# Patient Record
Sex: FEMALE | Race: WHITE | Employment: FULL TIME | URBAN - METROPOLITAN AREA
[De-identification: names, ages, dates, MRNs, and addresses within clinical notes are randomized per-mention and may not be internally consistent; named-entity substitution may affect disease eponyms.]

---

## 2017-01-12 ENCOUNTER — HOSPITAL ENCOUNTER (OUTPATIENT)
Age: 32
Discharge: HOME | End: 2017-01-12
Attending: FAMILY MEDICINE
Payer: COMMERCIAL

## 2017-01-12 VITALS
RESPIRATION RATE: 18 BRPM | BODY MASS INDEX: 36.29 KG/M2 | SYSTOLIC BLOOD PRESSURE: 125 MMHG | TEMPERATURE: 98 F | HEART RATE: 80 BPM | HEIGHT: 59 IN | DIASTOLIC BLOOD PRESSURE: 80 MMHG | OXYGEN SATURATION: 100 % | WEIGHT: 180 LBS

## 2017-01-12 DIAGNOSIS — J11.1 INFLUENZA: Primary | ICD-10-CM

## 2017-01-12 LAB — B-HCG UR QL: NEGATIVE

## 2017-01-12 PROCEDURE — 99213 OFFICE O/P EST LOW 20 MIN: CPT

## 2017-01-12 PROCEDURE — 81025 URINE PREGNANCY TEST: CPT

## 2017-01-12 PROCEDURE — 99214 OFFICE O/P EST MOD 30 MIN: CPT

## 2017-01-12 RX ORDER — ONDANSETRON 4 MG/1
4 TABLET, ORALLY DISINTEGRATING ORAL EVERY 4 HOURS PRN
Qty: 10 TABLET | Refills: 0 | Status: SHIPPED | OUTPATIENT
Start: 2017-01-12 | End: 2017-01-19

## 2017-01-13 NOTE — ED INITIAL ASSESSMENT (HPI)
N/V/D, chills, fever, dizziness x 1 1/2 weeks. No sick contacts at home. Body aches, headaches, sore throat. No flu shot this year.

## 2017-01-13 NOTE — ED PROVIDER NOTES
Patient Seen in: [facility] Immediate Care In [address] [place] [address]    History   Patient presents with:  Fever  Nausea/Vomiting/Diarrhea (gastrointestinal)    Stated Complaint: body ache,n/v/d    HPI    Patient here with cough, congestion, sneezing, sore throat, HPI.  Constitutional and vital signs reviewed. All other systems reviewed and negative except as noted above. PSFH elements reviewed from today and agreed except as otherwise stated in HPI.     Physical Exam       ED Triage Vitals   BP 01/12/17 1854 as warning signs and symptoms of when to return to clinic or go to the ER.     Disposition and Plan     Clinical Impression:  Influenza  (primary encounter diagnosis)    Disposition:  Discharge    Follow-up:  Isela Weaver MD  [address] [facility]

## 2017-05-05 ENCOUNTER — OFFICE VISIT (OUTPATIENT)
Dept: INTERNAL MEDICINE CLINIC | Facility: CLINIC | Age: 32
End: 2017-05-05

## 2017-05-05 ENCOUNTER — TELEPHONE (OUTPATIENT)
Dept: INTERNAL MEDICINE CLINIC | Facility: CLINIC | Age: 32
End: 2017-05-05

## 2017-05-05 VITALS
DIASTOLIC BLOOD PRESSURE: 73 MMHG | WEIGHT: 205 LBS | TEMPERATURE: 99 F | SYSTOLIC BLOOD PRESSURE: 112 MMHG | HEART RATE: 65 BPM | BODY MASS INDEX: 41 KG/M2

## 2017-05-05 DIAGNOSIS — M26.609 TMJ DYSFUNCTION: Primary | ICD-10-CM

## 2017-05-05 PROCEDURE — 99212 OFFICE O/P EST SF 10 MIN: CPT | Performed by: INTERNAL MEDICINE

## 2017-05-05 PROCEDURE — 99213 OFFICE O/P EST LOW 20 MIN: CPT | Performed by: INTERNAL MEDICINE

## 2017-05-05 RX ORDER — CYCLOBENZAPRINE HCL 10 MG
5 TABLET ORAL 2 TIMES DAILY
Qty: 20 TABLET | Refills: 1 | Status: SHIPPED | OUTPATIENT
Start: 2017-05-05 | End: 2017-06-22 | Stop reason: ALTCHOICE

## 2017-05-05 NOTE — PROGRESS NOTES
HPI:    Patient ID: Samaria Eng is a 28year old female. HPI She came in today complaining of jaw pain. According to her she was eating pretzels yesterday she is not sure if she heard clicking sound in her jaw , ut she started feeling pain , she Cyclobenzaprine HCl 10 MG Oral Tab Take 0.5 tablets (5 mg total) by mouth 2 (two) times daily. Disp: 20 tablet Rfl: 1   Prenatal Vit-Fe Fumarate-FA (PRENAPLUS) 27-1 MG Oral Tab Take  by mouth.  Disp:  Rfl:    MetFORMIN HCl 500 MG Oral Tab Take 1 tablet (500 Neck: Normal range of motion. Neck supple. No JVD present. No tracheal tenderness present. No tracheal deviation present. No thyroid mass and no thyromegaly present.    Cardiovascular: Normal rate, regular rhythm, normal heart sounds and intact distal pulse

## 2017-05-05 NOTE — TELEPHONE ENCOUNTER
patient was eating a pretzel yesterday and her jaw felt it popped out of place now her ear,jaw and teeth are numb  Please advice

## 2017-05-05 NOTE — TELEPHONE ENCOUNTER
Spoke with patient having jaw pain not relieved with ibuprofen or massage. Trouble talking or chewing. Made appt for today.

## 2017-05-05 NOTE — PATIENT INSTRUCTIONS
TMJ Syndrome  The temporomandibular joint (TMJ) is the joint that connects your lower jaw to your head. You can feel it in front of your ears when you open and close your mouth. TMJ disorders involve chronic or recurrent pain in the joint.  When treated, If stress seems to be contributing to your symptoms, try to identify the sources of stress in your life. These aren’t always obvious.  Common stressors include:  · Everyday hassles (which can add up), such as traffic jams, missed appointments, or car troubl · Trouble breathing or swallowing, wheezing  · Confusion  · Extreme drowsiness or trouble awakening  · Fainting or loss of consciousness  · Rapid heart rate  When to seek medical advice  Call your healthcare provider right away if any of these occur:  · Lillie Rodriguez · Avoid hard or chewy foods. Even if you feel fine, eating such foods can trigger symptoms again. · Be aware of your body. Don’t ignore TMD symptoms. The nagging pain in your neck or jaw may be a sign that you need care.   · Be sure to keep follow-up appoi You have been diagnosed with temporomandibular disorder (TMD). This term describes a group of problems related to the temporomandibular joint (TMJ)and nearby muscles. The TMJ is located where the upper and lower jaws meet.  TMD can cause painful and frustra · Massage, both inside and outside the mouth. This relaxes muscles and improves circulation. · Palpation, which is applying pressure to points of the jaw and face with the fingers. · Cold spray and stretching of the muscles to relax them.   · An anestheti

## 2017-06-22 ENCOUNTER — OFFICE VISIT (OUTPATIENT)
Dept: INTERNAL MEDICINE CLINIC | Facility: CLINIC | Age: 32
End: 2017-06-22

## 2017-06-22 VITALS
HEART RATE: 76 BPM | DIASTOLIC BLOOD PRESSURE: 64 MMHG | BODY MASS INDEX: 41.12 KG/M2 | SYSTOLIC BLOOD PRESSURE: 100 MMHG | HEIGHT: 59 IN | WEIGHT: 204 LBS

## 2017-06-22 DIAGNOSIS — R42 VERTIGO: ICD-10-CM

## 2017-06-22 DIAGNOSIS — H65.191 OTHER ACUTE NONSUPPURATIVE OTITIS MEDIA OF RIGHT EAR: Primary | ICD-10-CM

## 2017-06-22 PROCEDURE — 99212 OFFICE O/P EST SF 10 MIN: CPT | Performed by: INTERNAL MEDICINE

## 2017-06-22 PROCEDURE — 99214 OFFICE O/P EST MOD 30 MIN: CPT | Performed by: INTERNAL MEDICINE

## 2017-06-22 RX ORDER — AMOXICILLIN 875 MG/1
875 TABLET, COATED ORAL 2 TIMES DAILY
Qty: 20 TABLET | Refills: 0 | Status: SHIPPED | OUTPATIENT
Start: 2017-06-22 | End: 2017-07-02

## 2017-06-22 RX ORDER — MECLIZINE HYDROCHLORIDE 25 MG/1
25 TABLET ORAL 3 TIMES DAILY PRN
Qty: 60 TABLET | Refills: 0 | Status: SHIPPED | OUTPATIENT
Start: 2017-06-22 | End: 2017-07-12

## 2017-06-23 NOTE — PROGRESS NOTES
HPI:    Patient ID: Mariya Palencia is a 28year old female. HPI  Pt comes in with complaint of rt ear pain, also vertiogo and nausea for few days. Pt uses q tips to clean ears     Review of Systems   Constitutional: Negative.     HENT: Positive for Pupils are equal, round, and reactive to light. Neck: Normal range of motion. Neck supple. Cardiovascular: Normal rate, regular rhythm, normal heart sounds and intact distal pulses.     Pulmonary/Chest: Effort normal and breath sounds normal.   Abdomina

## 2017-06-23 NOTE — PATIENT INSTRUCTIONS
ASSESSMENT/PLAN:   Other acute nonsuppurative otitis media of right ear  (primary encounter diagnosis)- will treat with ab, use as prescribed and do not use q tips  Vertigo- due to above and q tip use will treat with med, take as prescribed and once the in

## 2017-06-26 ENCOUNTER — OFFICE VISIT (OUTPATIENT)
Dept: INTERNAL MEDICINE CLINIC | Facility: CLINIC | Age: 32
End: 2017-06-26

## 2017-06-26 VITALS — TEMPERATURE: 99 F | HEART RATE: 67 BPM | DIASTOLIC BLOOD PRESSURE: 66 MMHG | SYSTOLIC BLOOD PRESSURE: 99 MMHG

## 2017-06-26 DIAGNOSIS — L23.9 ALLERGIC CONTACT DERMATITIS, UNSPECIFIED TRIGGER: Primary | ICD-10-CM

## 2017-06-26 PROBLEM — L30.9 DERMATITIS: Status: ACTIVE | Noted: 2017-06-26

## 2017-06-26 PROCEDURE — 99212 OFFICE O/P EST SF 10 MIN: CPT | Performed by: INTERNAL MEDICINE

## 2017-06-26 PROCEDURE — 99214 OFFICE O/P EST MOD 30 MIN: CPT | Performed by: INTERNAL MEDICINE

## 2017-06-26 RX ORDER — CLOBETASOL PROPIONATE 0.5 MG/G
CREAM TOPICAL
Qty: 1 TUBE | Refills: 0 | Status: SHIPPED | OUTPATIENT
Start: 2017-06-26 | End: 2018-12-18

## 2017-06-26 NOTE — PATIENT INSTRUCTIONS
Allergic contact dermatitis, unspecified trigger  (primary encounter diagnosis) most likely after shaving , dont use same razor or deodorant , clean armpit with warm water and soap, apply cortisone cream bid , if not better call     Right ear infection - b

## 2017-06-26 NOTE — PROGRESS NOTES
HPI:    Patient ID: Arely Alvarez is a 28year old female. HPI she is here today complaining of rash on her armpit .  She states that started on Friday, with itchiness and redness under the armpit and according to her she thought is due to  [employer] tobacco: Never Used                      Alcohol use: No                 PHYSICAL EXAM:    Physical Exam   Constitutional: She is oriented to person, place, and time. She appears well-developed and well-nourished.    HENT:   Head: Normocephalic and atraumat

## 2017-06-29 ENCOUNTER — TELEPHONE (OUTPATIENT)
Dept: INTERNAL MEDICINE CLINIC | Facility: CLINIC | Age: 32
End: 2017-06-29

## 2017-06-29 DIAGNOSIS — R42 VERTIGO: Primary | ICD-10-CM

## 2017-06-29 DIAGNOSIS — H66.93 BILATERAL OTITIS MEDIA, UNSPECIFIED CHRONICITY, UNSPECIFIED OTITIS MEDIA TYPE: ICD-10-CM

## 2017-06-29 NOTE — TELEPHONE ENCOUNTER
Spoke with patient, per patient her ear infection does not feel better. Still with vertigo, feels worse when she took meclizine. Hearing feels muffled. Denies any discharge from ears. Last dose for amoxicillin 875 mg is 7/1. Please advise.  Patient aware do

## 2017-06-30 ENCOUNTER — OFFICE VISIT (OUTPATIENT)
Dept: OTOLARYNGOLOGY | Facility: CLINIC | Age: 32
End: 2017-06-30

## 2017-06-30 ENCOUNTER — OFFICE VISIT (OUTPATIENT)
Dept: AUDIOLOGY | Facility: CLINIC | Age: 32
End: 2017-06-30

## 2017-06-30 VITALS
HEART RATE: 68 BPM | HEIGHT: 59 IN | TEMPERATURE: 99 F | BODY MASS INDEX: 41.12 KG/M2 | WEIGHT: 204 LBS | DIASTOLIC BLOOD PRESSURE: 64 MMHG | SYSTOLIC BLOOD PRESSURE: 101 MMHG

## 2017-06-30 DIAGNOSIS — R42 DIZZINESS: Primary | ICD-10-CM

## 2017-06-30 PROCEDURE — 92567 TYMPANOMETRY: CPT | Performed by: AUDIOLOGIST

## 2017-06-30 PROCEDURE — 92557 COMPREHENSIVE HEARING TEST: CPT | Performed by: AUDIOLOGIST

## 2017-06-30 PROCEDURE — 99212 OFFICE O/P EST SF 10 MIN: CPT | Performed by: OTOLARYNGOLOGY

## 2017-06-30 PROCEDURE — 99243 OFF/OP CNSLTJ NEW/EST LOW 30: CPT | Performed by: OTOLARYNGOLOGY

## 2017-06-30 NOTE — PROGRESS NOTES
Joselito West is a 28year old female. Patient presents with:  Dizziness: Patient is here today c/o Dizziness for about a week.  Patient was prescribed antibiotics and Meclizine which she state isn't helping      HISTORY OF PRESENT ILLNESS  6/30/201 Dyspnea and wheezing. Cardio Negative Chest pain, irregular heartbeat/palpitations and syncope. GI Negative Abdominal pain and diarrhea. Endocrine Negative Cold intolerance and heat intolerance. Neuro Negative Tremors.    Psych Negative Anxiety and - Right: Normal, Left: Normal.        Current Outpatient Prescriptions:   •  amoxicillin 875 MG Oral Tab, Take 1 tablet (875 mg total) by mouth 2 (two) times daily. , Disp: 20 tablet, Rfl: 0  •  Clobetasol Propionate 0.05 % External Cream, Apply to affected Sony Covarrubias MD

## 2017-07-01 NOTE — PROGRESS NOTES
AUDIOLOGY REPORT      Dar Au is a 28year old female     Referring Provider: Sonja Ng   YOB: 1985  Medical Record: [id number]      Patient Hearing History:  Patient reported dizziness.      Otoscopic Inspection:  Right ear:

## 2017-07-06 ENCOUNTER — TELEPHONE (OUTPATIENT)
Dept: OTOLARYNGOLOGY | Facility: CLINIC | Age: 32
End: 2017-07-06

## 2017-07-06 NOTE — TELEPHONE ENCOUNTER
Spoke with pt and informed that she can schedule CT brain at this time, authorization number [id number], valid 7-5-17 thru 8-3-17 to be done at [facility]. It is the patient's responsibility to verify benefits and out of pocket expenses for

## 2017-07-17 ENCOUNTER — OFFICE VISIT (OUTPATIENT)
Dept: AUDIOLOGY | Facility: CLINIC | Age: 32
End: 2017-07-17

## 2017-07-17 DIAGNOSIS — R42 DIZZINESS: Primary | ICD-10-CM

## 2017-07-17 PROCEDURE — 92537 CALORIC VSTBLR TEST W/REC: CPT | Performed by: AUDIOLOGIST

## 2017-07-17 PROCEDURE — 92540 BASIC VESTIBULAR EVALUATION: CPT | Performed by: AUDIOLOGIST

## 2017-07-18 NOTE — PROGRESS NOTES
Audiometrics:  VNG    Alicia Vega  9/30/1985  [id number]    Alicia Vega was referred for testing by Ivan Laguna.      History:  Pt reported the following history:  About one month ago, following a vacation pt awoke with vertigo and nausea deg/sec  Right cool caloric:          26 deg/sec  Left warm caloric:           42  deg/sec  Right warm caloric:         53 deg/sec     Results indicated a 7% left unilateral weakness to air caloric stimulation. This is within normal limits.      Summary:

## 2017-07-21 ENCOUNTER — TELEPHONE (OUTPATIENT)
Dept: OTOLARYNGOLOGY | Facility: CLINIC | Age: 32
End: 2017-07-21

## 2017-07-24 ENCOUNTER — HOSPITAL ENCOUNTER (OUTPATIENT)
Dept: CT IMAGING | Facility: HOSPITAL | Age: 32
Discharge: HOME | End: 2017-07-24
Attending: OTOLARYNGOLOGY
Payer: COMMERCIAL

## 2017-07-24 DIAGNOSIS — R42 DIZZINESS: ICD-10-CM

## 2017-07-24 PROCEDURE — 70450 CT HEAD/BRAIN W/O DYE: CPT | Performed by: OTOLARYNGOLOGY

## 2017-07-25 NOTE — TELEPHONE ENCOUNTER
Informed patient VNG results were normal, Patient states still has dizziness everyday for the past month, and is unable to drive due to dizziness. Would like to know what is the next step? What are your recommendations? Please advise.

## 2017-08-09 ENCOUNTER — OFFICE VISIT (OUTPATIENT)
Dept: INTERNAL MEDICINE CLINIC | Facility: CLINIC | Age: 32
End: 2017-08-09

## 2017-08-09 VITALS
DIASTOLIC BLOOD PRESSURE: 66 MMHG | WEIGHT: 207 LBS | OXYGEN SATURATION: 98 % | SYSTOLIC BLOOD PRESSURE: 99 MMHG | BODY MASS INDEX: 41.73 KG/M2 | TEMPERATURE: 99 F | HEIGHT: 59 IN | HEART RATE: 92 BPM

## 2017-08-09 DIAGNOSIS — J02.9 PHARYNGITIS, UNSPECIFIED ETIOLOGY: Primary | ICD-10-CM

## 2017-08-09 LAB
CONTROL LINE PRESENT WITH A CLEAR BACKGROUND (YES/NO): YES YES/NO
KIT LOT #: NORMAL NUMERIC

## 2017-08-09 PROCEDURE — 99212 OFFICE O/P EST SF 10 MIN: CPT | Performed by: INTERNAL MEDICINE

## 2017-08-09 PROCEDURE — 99213 OFFICE O/P EST LOW 20 MIN: CPT | Performed by: INTERNAL MEDICINE

## 2017-08-09 PROCEDURE — 87880 STREP A ASSAY W/OPTIC: CPT | Performed by: INTERNAL MEDICINE

## 2017-08-09 RX ORDER — AMOXICILLIN AND CLAVULANATE POTASSIUM 875; 125 MG/1; MG/1
1 TABLET, FILM COATED ORAL 2 TIMES DAILY
Qty: 20 TABLET | Refills: 0 | Status: SHIPPED | OUTPATIENT
Start: 2017-08-09 | End: 2017-08-19

## 2017-08-09 NOTE — PATIENT INSTRUCTIONS
Pharyngitis, unspecified etiology  (primary encounter diagnosis)-we will give antibiotic and take as directed with food til done. Take probiotics while on antibiotics if can to prevent yeast infections. Use extra form of protection while on antibiotics.  An Prevention tips include the following:  · Stop smoking or reduce contact with secondhand smoke. Smoke irritates the tender throat lining. · Limit contact with pets and with allergy-causing substances, such as pollen and mold.   · When you’re around someone A test has been done to find out whether you (or your child, if your child is the patient) have strep throat. Call this facility or your healthcare provider if you were not given your test results.  If the test is positive for strep infection, you will need · Use throat lozenges or numbing throat sprays to help reduce pain. Gargling with warm salt water will also help reduce throat pain. For this, dissolve 1/2 teaspoon of salt in 1 glass of warm water.  To help soothe a sore throat, children can sip on juice o

## 2017-08-09 NOTE — PROGRESS NOTES
HPI:    Patient ID: Elizabeth Brown is a 28year old female. HPI she came today complaining of sore throat since Sunday. According to her she has difficulty swallowing and pain,that is  Getting worse , had slight fever on Sunday as well.   She does Disp: 20 tablet Rfl: 0   Clobetasol Propionate 0.05 % External Cream Apply to affected area bid Disp: 1 Tube Rfl: 0   MetFORMIN HCl 500 MG Oral Tab Take 1 tablet (500 mg total) by mouth 2 (two) times daily.  Disp: 90 tablet Rfl: 1     Allergies:  Gentamycin tracheal tenderness present. No tracheal deviation present. No thyroid mass and no thyromegaly present. Cardiovascular: Normal rate, regular rhythm, normal heart sounds and intact distal pulses. Exam reveals no gallop and no friction rub.     No murmur h

## 2018-08-23 ENCOUNTER — HOSPITAL ENCOUNTER (OUTPATIENT)
Age: 33
Discharge: HOME | End: 2018-08-23
Attending: EMERGENCY MEDICINE
Payer: COMMERCIAL

## 2018-08-23 VITALS
SYSTOLIC BLOOD PRESSURE: 100 MMHG | OXYGEN SATURATION: 98 % | DIASTOLIC BLOOD PRESSURE: 60 MMHG | RESPIRATION RATE: 18 BRPM | TEMPERATURE: 99 F | HEART RATE: 79 BPM

## 2018-08-23 DIAGNOSIS — J01.90 ACUTE SINUSITIS, RECURRENCE NOT SPECIFIED, UNSPECIFIED LOCATION: Primary | ICD-10-CM

## 2018-08-23 PROCEDURE — 99213 OFFICE O/P EST LOW 20 MIN: CPT

## 2018-08-23 PROCEDURE — 99214 OFFICE O/P EST MOD 30 MIN: CPT

## 2018-08-23 RX ORDER — AZITHROMYCIN 250 MG/1
TABLET, FILM COATED ORAL
Qty: 1 PACKAGE | Refills: 0 | Status: SHIPPED | OUTPATIENT
Start: 2018-08-23 | End: 2018-08-28

## 2018-08-24 NOTE — ED PROVIDER NOTES
Patient Seen in: [facility] Immediate Care In [place]    History     Stated Complaint: face pressure    HPI    The patient  complains of sinus pain and congestion which started 4 days ago. The patient also complains of sore throat and a cough. without impairment        MDM     Discussed with patient antibiotic treatment for possible bacterial sinusitis.   Patient stated she had responded to Zithromax in the past without adverse reaction          Disposition and Plan     Clinical Impression:  [place]

## 2018-12-18 ENCOUNTER — HOSPITAL ENCOUNTER (OUTPATIENT)
Age: 33
Discharge: HOME | End: 2018-12-18
Payer: COMMERCIAL

## 2018-12-18 VITALS
RESPIRATION RATE: 16 BRPM | BODY MASS INDEX: 38 KG/M2 | OXYGEN SATURATION: 100 % | HEART RATE: 75 BPM | WEIGHT: 190 LBS | TEMPERATURE: 98 F | SYSTOLIC BLOOD PRESSURE: 109 MMHG | DIASTOLIC BLOOD PRESSURE: 74 MMHG

## 2018-12-18 DIAGNOSIS — H66.001 NON-RECURRENT ACUTE SUPPURATIVE OTITIS MEDIA OF RIGHT EAR WITHOUT SPONTANEOUS RUPTURE OF TYMPANIC MEMBRANE: Primary | ICD-10-CM

## 2018-12-18 PROCEDURE — 99213 OFFICE O/P EST LOW 20 MIN: CPT

## 2018-12-18 PROCEDURE — 99214 OFFICE O/P EST MOD 30 MIN: CPT

## 2018-12-18 RX ORDER — AMOXICILLIN 875 MG/1
875 TABLET, COATED ORAL 2 TIMES DAILY
Qty: 20 TABLET | Refills: 0 | Status: SHIPPED | OUTPATIENT
Start: 2018-12-18 | End: 2018-12-28

## 2018-12-19 NOTE — ED PROVIDER NOTES
Patient presents with:  Ear Problem Pain (neurosensory)      HPI:     Leanor Endicott is a 29year old female presents with right ear pain that started last night. Reports pain radiates down into the right side of her face.   Also complaining of nasal orders of the defined types were placed in this encounter. Labs performed this visit:  No results found for this or any previous visit (from the past 10 hour(s)).     Diagnosis:    ICD-10-CM    1. Non-recurrent acute suppurative otitis media of right e

## 2018-12-19 NOTE — ED NOTES
Increase po fluids fill and finish po meds motrin or tylenol for fever aches and pains.  Call and follow up with pcp in office 3 days go to the ed for new or  worse concerns

## 2019-01-02 ENCOUNTER — TELEPHONE (OUTPATIENT)
Dept: INTERNAL MEDICINE CLINIC | Facility: CLINIC | Age: 34
End: 2019-01-02

## 2019-01-02 ENCOUNTER — OFFICE VISIT (OUTPATIENT)
Dept: INTERNAL MEDICINE CLINIC | Facility: CLINIC | Age: 34
End: 2019-01-02
Payer: COMMERCIAL

## 2019-01-02 VITALS
OXYGEN SATURATION: 95 % | DIASTOLIC BLOOD PRESSURE: 64 MMHG | TEMPERATURE: 98 F | HEIGHT: 59 IN | SYSTOLIC BLOOD PRESSURE: 90 MMHG | BODY MASS INDEX: 43.02 KG/M2 | HEART RATE: 80 BPM | WEIGHT: 213.38 LBS

## 2019-01-02 DIAGNOSIS — J32.9 RECURRENT SINUS INFECTIONS: Primary | ICD-10-CM

## 2019-01-02 PROCEDURE — 99214 OFFICE O/P EST MOD 30 MIN: CPT | Performed by: INTERNAL MEDICINE

## 2019-01-02 RX ORDER — DOXYCYCLINE HYCLATE 100 MG
100 TABLET ORAL 2 TIMES DAILY
Qty: 10 TABLET | Refills: 0 | Status: SHIPPED | OUTPATIENT
Start: 2019-01-02 | End: 2019-07-15 | Stop reason: ALTCHOICE

## 2019-01-02 RX ORDER — MONTELUKAST SODIUM 10 MG/1
10 TABLET ORAL NIGHTLY
Qty: 30 TABLET | Refills: 0 | Status: SHIPPED | OUTPATIENT
Start: 2019-01-02 | End: 2019-01-02

## 2019-01-02 RX ORDER — FLUTICASONE PROPIONATE 50 MCG
1 SPRAY, SUSPENSION (ML) NASAL DAILY
Qty: 1 INHALER | Refills: 0 | Status: SHIPPED | OUTPATIENT
Start: 2019-01-02 | End: 2019-07-15 | Stop reason: ALTCHOICE

## 2019-01-02 RX ORDER — MONTELUKAST SODIUM 10 MG/1
10 TABLET ORAL NIGHTLY
Qty: 90 TABLET | Refills: 0 | Status: SHIPPED | OUTPATIENT
Start: 2019-01-02

## 2019-01-03 NOTE — PROGRESS NOTES
HPI:    Patient ID: Liam Casanova is a 29year old female. HPI came in today complaining of sinus pressure and headache.   According to her she was recently diagnosed with ear infection she went to urgent care they prescribed amoxicillin stanislaw torres nervous/anxious. Current Outpatient Medications:  Fluticasone Propionate 50 MCG/ACT Nasal Suspension 1 spray by Each Nare route daily.  Disp: 1 Inhaler Rfl: 0   Doxycycline Hyclate 100 MG Oral Tab Take 1 tablet (100 mg total) by mouth 2 (two) jacky no discharge. No scleral icterus. Neck: Normal range of motion. Neck supple. No JVD present. No tracheal tenderness present. No tracheal deviation present. No thyroid mass and no thyromegaly present.    Cardiovascular: Normal rate, regular rhythm, normal Hyclate 100 MG Oral Tab 10 tablet 0     Sig: Take 1 tablet (100 mg total) by mouth 2 (two) times daily. • Montelukast Sodium 10 MG Oral Tab 90 tablet 0     Sig: Take 1 tablet (10 mg total) by mouth nightly.        Imaging & Referrals:  XR SINUSES, PARANAS

## 2019-01-03 NOTE — TELEPHONE ENCOUNTER
Patient was seen tonight received fax from [employer] patient is requesting 90 day refill for Montelukast

## 2019-01-03 NOTE — PATIENT INSTRUCTIONS
Recurrent sinus infections  (primary encounter diagnosis) ? unclear - will try singulair, flonase nasal spray , few more days antibiotic discussed about side effects ,advised to take with food, take some yogurt with probiotic, will order xr sinuses- if not

## 2019-03-19 ENCOUNTER — TELEPHONE (OUTPATIENT)
Dept: INTERNAL MEDICINE CLINIC | Facility: CLINIC | Age: 34
End: 2019-03-19

## 2019-03-19 NOTE — TELEPHONE ENCOUNTER
Pt asking if Dr RANGEL can write a Letter. Pt unable to go to the Gym because of  her anxiety and depresion. They can't cancel her membership.

## 2019-03-19 NOTE — TELEPHONE ENCOUNTER
We have no documentation anywhere that she suffers form anxiety , unfortunately I cannot send the letter for diagnosis that was never discussed or documented .

## 2019-07-15 PROBLEM — J02.9 PHARYNGITIS: Status: RESOLVED | Noted: 2017-08-09 | Resolved: 2019-07-15

## 2019-07-15 NOTE — PATIENT INSTRUCTIONS
As discussed, I also agree that you do not have to be on any medications now. I have given you a referral to see behavioral specialist.  They will be contacting you in the next 24 to 48 hours.   If you do not hear anything back from them, please give me a than CBT. Or, you may move from CBT to another form of therapy as your treatment needs change. This may mean meeting with a therapist by yourself or in a group.  Therapy can also help you work through problems in your life, such as drug or alcohol dependenc turn to alcohol or unprescribed drugs for relief. They only make things worse in the long run. Date Last Reviewed: 1/1/2017  © 5828-3851 [employer]. [address] [facility], [facility] [place]. All rights reserved.  This information is not in

## 2019-07-15 NOTE — PROGRESS NOTES
Sadia Kee is a 29year old female. Patient presents with:  Depression: sx started about 9 months ago, sx seem to be getting worse  Anxiety: has been able to control sx but now its getting worse  Swelling: in hands, feet and ankles    HPI:   32-ye cough and shortness of breath. Cardiovascular: Negative for chest pain. Gastrointestinal: Negative for vomiting, abdominal pain and abdominal distention. Genitourinary: Negative for hematuria. Skin: Negative for wound.    Psychiatric/Behavioral: Ne

## 2019-09-07 ENCOUNTER — NURSE TRIAGE (OUTPATIENT)
Dept: INTERNAL MEDICINE CLINIC | Facility: CLINIC | Age: 34
End: 2019-09-07

## 2019-09-07 ENCOUNTER — HOSPITAL ENCOUNTER (EMERGENCY)
Facility: HOSPITAL | Age: 34
Discharge: HOME | End: 2019-09-07
Payer: COMMERCIAL

## 2019-09-07 VITALS
RESPIRATION RATE: 14 BRPM | DIASTOLIC BLOOD PRESSURE: 63 MMHG | HEIGHT: 59 IN | SYSTOLIC BLOOD PRESSURE: 119 MMHG | WEIGHT: 180 LBS | HEART RATE: 67 BPM | BODY MASS INDEX: 36.29 KG/M2 | TEMPERATURE: 98 F | OXYGEN SATURATION: 98 %

## 2019-09-07 DIAGNOSIS — N93.8 DYSFUNCTIONAL UTERINE BLEEDING: Primary | ICD-10-CM

## 2019-09-07 LAB
ANION GAP SERPL CALC-SCNC: 6 MMOL/L (ref 0–18)
B-HCG UR QL: NEGATIVE
BASOPHILS # BLD AUTO: 0.06 X10(3) UL (ref 0–0.2)
BASOPHILS NFR BLD AUTO: 0.6 %
BILIRUB UR QL: NEGATIVE
BUN BLD-MCNC: 14 MG/DL (ref 7–18)
BUN/CREAT SERPL: 20.6 (ref 10–20)
CALCIUM BLD-MCNC: 8.5 MG/DL (ref 8.5–10.1)
CHLORIDE SERPL-SCNC: 108 MMOL/L (ref 98–112)
CO2 SERPL-SCNC: 27 MMOL/L (ref 21–32)
COLOR UR: YELLOW
CREAT BLD-MCNC: 0.68 MG/DL (ref 0.55–1.02)
DEPRECATED RDW RBC AUTO: 43.8 FL (ref 35.1–46.3)
EOSINOPHIL # BLD AUTO: 0.24 X10(3) UL (ref 0–0.7)
EOSINOPHIL NFR BLD AUTO: 2.4 %
ERYTHROCYTE [DISTWIDTH] IN BLOOD BY AUTOMATED COUNT: 14.6 % (ref 11–15)
GLUCOSE BLD-MCNC: 82 MG/DL (ref 70–99)
GLUCOSE UR-MCNC: NEGATIVE MG/DL
HCT VFR BLD AUTO: 39.2 % (ref 35–48)
HGB BLD-MCNC: 12.5 G/DL (ref 12–16)
IMM GRANULOCYTES # BLD AUTO: 0.05 X10(3) UL (ref 0–1)
IMM GRANULOCYTES NFR BLD: 0.5 %
KETONES UR-MCNC: NEGATIVE MG/DL
LEUKOCYTE ESTERASE UR QL STRIP.AUTO: NEGATIVE
LYMPHOCYTES # BLD AUTO: 2.99 X10(3) UL (ref 1–4)
LYMPHOCYTES NFR BLD AUTO: 29.4 %
MCH RBC QN AUTO: 26.2 PG (ref 26–34)
MCHC RBC AUTO-ENTMCNC: 31.9 G/DL (ref 31–37)
MCV RBC AUTO: 82 FL (ref 80–100)
MONOCYTES # BLD AUTO: 0.77 X10(3) UL (ref 0.1–1)
MONOCYTES NFR BLD AUTO: 7.6 %
NEUTROPHILS # BLD AUTO: 6.07 X10 (3) UL (ref 1.5–7.7)
NEUTROPHILS # BLD AUTO: 6.07 X10(3) UL (ref 1.5–7.7)
NEUTROPHILS NFR BLD AUTO: 59.5 %
NITRITE UR QL STRIP.AUTO: NEGATIVE
OSMOLALITY SERPL CALC.SUM OF ELEC: 292 MOSM/KG (ref 275–295)
PH UR: 6 [PH] (ref 5–8)
PLATELET # BLD AUTO: 367 10(3)UL (ref 150–450)
POTASSIUM SERPL-SCNC: 3.9 MMOL/L (ref 3.5–5.1)
PROT UR-MCNC: NEGATIVE MG/DL
RBC # BLD AUTO: 4.78 X10(6)UL (ref 3.8–5.3)
RBC #/AREA URNS AUTO: 261 /HPF
SODIUM SERPL-SCNC: 141 MMOL/L (ref 136–145)
SP GR UR STRIP: 1.03 (ref 1–1.03)
UROBILINOGEN UR STRIP-ACNC: <2
VIT C UR-MCNC: NEGATIVE MG/DL
WBC # BLD AUTO: 10.2 X10(3) UL (ref 4–11)
WBC #/AREA URNS AUTO: 1 /HPF

## 2019-09-07 PROCEDURE — 36415 COLL VENOUS BLD VENIPUNCTURE: CPT

## 2019-09-07 PROCEDURE — 81025 URINE PREGNANCY TEST: CPT

## 2019-09-07 PROCEDURE — 81001 URINALYSIS AUTO W/SCOPE: CPT | Performed by: NURSE PRACTITIONER

## 2019-09-07 PROCEDURE — 99283 EMERGENCY DEPT VISIT LOW MDM: CPT

## 2019-09-07 PROCEDURE — 85025 COMPLETE CBC W/AUTO DIFF WBC: CPT | Performed by: NURSE PRACTITIONER

## 2019-09-07 PROCEDURE — 80048 BASIC METABOLIC PNL TOTAL CA: CPT | Performed by: NURSE PRACTITIONER

## 2019-09-07 NOTE — ED PROVIDER NOTES
Patient Seen in: [facility] Emergency Department    History   CC: vag bleeding  HPI: Alicia Huffman 29year old female w/ hx PCOS who presents to the ER c/o menstruation starting 3 wks ago and cont. Into today.  States hx of PCOS but typical 99 %   O2 Device None (Room air)       Current:/63   Pulse 73   Temp 98 °F (36.7 °C) (Temporal)   Resp 18   Ht 149.9 cm (4' 11\")   Wt 81.6 kg   SpO2 99%   BMI 36.36 kg/m²         General - Appears well, non-toxic and in NAD  Head - Appears symmetric advised follow-up with gynecology as well as return precautions for ER reevaluation. Case was discussed with Dr. Linnea Lazaro who agrees with plan of care. Patient demonstrates understanding of all instruction and agrees with plan of care.     Disposition and Plan

## 2019-09-07 NOTE — TELEPHONE ENCOUNTER
Pt calling states she been bleeding heavy for 3 weeks.   Also she states it is clogging with heavy blood      Please advise

## 2019-09-07 NOTE — TELEPHONE ENCOUNTER
Action Requested: Summary for Provider     []  Critical Lab, Recommendations Needed  [] Need Additional Advice  []   FYI    []   Need Orders  [] Need Medications Sent to Pharmacy  []  Other     SUMMARY: ER f/u 9/9  FYI Dr. Subhash Moss pt stated that she has be

## 2019-09-09 NOTE — TELEPHONE ENCOUNTER
CSS please call pt a schedule a F/u appt with  if needed. Thanks    Clinical Impression:  Dysfunctional uterine bleeding  (primary encounter diagnosis)     Disposition:  Discharge     Follow-up:  Lynn Sandoval MD  [address] [facility]

## 2019-09-16 ENCOUNTER — OFFICE VISIT (OUTPATIENT)
Dept: OBGYN CLINIC | Facility: CLINIC | Age: 34
End: 2019-09-16
Payer: COMMERCIAL

## 2019-09-16 VITALS
WEIGHT: 218 LBS | BODY MASS INDEX: 44 KG/M2 | DIASTOLIC BLOOD PRESSURE: 77 MMHG | HEART RATE: 75 BPM | SYSTOLIC BLOOD PRESSURE: 108 MMHG

## 2019-09-16 DIAGNOSIS — E28.2 PCOS (POLYCYSTIC OVARIAN SYNDROME): ICD-10-CM

## 2019-09-16 DIAGNOSIS — N93.9 ABNORMAL UTERINE BLEEDING (AUB): Primary | ICD-10-CM

## 2019-09-16 PROCEDURE — 99213 OFFICE O/P EST LOW 20 MIN: CPT | Performed by: ADVANCED PRACTICE MIDWIFE

## 2019-09-16 RX ORDER — MEDROXYPROGESTERONE ACETATE 10 MG/1
TABLET ORAL
Qty: 90 TABLET | Refills: 0 | Status: SHIPPED | OUTPATIENT
Start: 2019-09-16

## 2019-09-16 RX ORDER — MEDROXYPROGESTERONE ACETATE 10 MG/1
10 TABLET ORAL DAILY
Qty: 10 TABLET | Refills: 0 | Status: SHIPPED | OUTPATIENT
Start: 2019-09-16 | End: 2019-09-16

## 2019-09-16 NOTE — PROGRESS NOTES
HPI:    Patient ID: Cade Carson is a 29year old female. Was in the ER for prolonged and heavy bleeding. LMP 8/18/19 and has not stopped bleeding yet. May stop for 1 hour then comes back. Changes 3-4 pads per day.   Is lighter today than it Signed Prescriptions Disp Refills   • medroxyPROGESTERone Acetate 10 MG Oral Tab 10 tablet 0     Sig: Take 1 tablet (10 mg total) by mouth daily for 10 days.        Imaging & Referrals:  [facility] - INTERNAL       CW#[id number]

## 2019-09-17 ENCOUNTER — TELEPHONE (OUTPATIENT)
Dept: OBGYN CLINIC | Facility: CLINIC | Age: 34
End: 2019-09-17

## 2019-09-18 NOTE — TELEPHONE ENCOUNTER
I just saw her yesterday. The progesterone can take up to 10 days to stop the bleeding.   Please explain this to her

## 2019-09-24 ENCOUNTER — TELEPHONE (OUTPATIENT)
Dept: OBGYN CLINIC | Facility: CLINIC | Age: 34
End: 2019-09-24

## 2019-09-24 NOTE — TELEPHONE ENCOUNTER
Pt would like to know how long appt will be on 9/30. States Bambi Wilcox mentioned getting lab work done, u/s and pap at this appointment. Please advise.

## 2019-09-26 ENCOUNTER — TELEPHONE (OUTPATIENT)
Dept: OBGYN CLINIC | Facility: CLINIC | Age: 34
End: 2019-09-26

## 2019-09-26 NOTE — TELEPHONE ENCOUNTER
Completed provera Rx yesterday. Bleeding did not completely stop during course (had light spotting) but has return of menses like bleeding today. Pt is concerned as prior to Provera challenge had 5 weeks of bleeding.   RN explains to patient that bleeding a

## 2019-09-28 NOTE — TELEPHONE ENCOUNTER
Informed pt of message below. Pt voices understanding. Pt voices she is not taking BC because her and her  would like to conceive.  Pt still having light vaginal bleeding, but pt will still come in for her appointment on Monday, because she would [facility]

## 2019-09-28 NOTE — TELEPHONE ENCOUNTER
As explained at visit. Once stopping provera within 1-10 days a menses will occur because stopping or withdrawing provera triggers shedding of any remaining lining.  She should start the birth control pill as soon as she drops the provera

## 2019-09-30 ENCOUNTER — TELEPHONE (OUTPATIENT)
Dept: OBGYN CLINIC | Facility: CLINIC | Age: 34
End: 2019-09-30

## 2019-09-30 ENCOUNTER — OFFICE VISIT (OUTPATIENT)
Dept: OBGYN CLINIC | Facility: CLINIC | Age: 34
End: 2019-09-30
Payer: COMMERCIAL

## 2019-09-30 VITALS — WEIGHT: 218 LBS | DIASTOLIC BLOOD PRESSURE: 72 MMHG | SYSTOLIC BLOOD PRESSURE: 104 MMHG | BODY MASS INDEX: 44 KG/M2

## 2019-09-30 DIAGNOSIS — N93.9 ABNORMAL UTERINE BLEEDING (AUB): Primary | ICD-10-CM

## 2019-09-30 LAB
CONTROL LINE PRESENT WITH A CLEAR BACKGROUND (YES/NO): YES YES/NO
KIT LOT #: NORMAL NUMERIC
PREGNANCY TEST, URINE: NEGATIVE

## 2019-09-30 PROCEDURE — 99213 OFFICE O/P EST LOW 20 MIN: CPT | Performed by: ADVANCED PRACTICE MIDWIFE

## 2019-09-30 PROCEDURE — 81025 URINE PREGNANCY TEST: CPT | Performed by: ADVANCED PRACTICE MIDWIFE

## 2019-09-30 RX ORDER — MEDROXYPROGESTERONE ACETATE 10 MG/1
10 TABLET ORAL DAILY
Qty: 30 TABLET | Refills: 0 | Status: SHIPPED | OUTPATIENT
Start: 2019-09-30 | End: 2019-09-30

## 2019-09-30 RX ORDER — MEDROXYPROGESTERONE ACETATE 10 MG/1
TABLET ORAL
Qty: 90 TABLET | Refills: 0 | Status: SHIPPED | OUTPATIENT
Start: 2019-09-30

## 2019-09-30 NOTE — PROGRESS NOTES
HPI:    Patient ID: Mariajose Mckinnon is a 36year old female. Was started on Progesterone for AUB 9/16/19. Bleeding gradually stopped with spotting off and on. Stopped the provera on Wednesday and began bleeding heavy again on  Thrusday.   Is stil pregnancy test [88949]      Meds This Visit:  Requested Prescriptions     Signed Prescriptions Disp Refills   • medroxyPROGESTERone Acetate (PROVERA) 10 MG Oral Tab 30 tablet 0     Sig: Take 1 tablet (10 mg total) by mouth daily.        Imaging & Referrals:

## 2019-10-02 ENCOUNTER — TELEPHONE (OUTPATIENT)
Dept: OBGYN CLINIC | Facility: CLINIC | Age: 34
End: 2019-10-02

## 2019-10-02 RX ORDER — ONDANSETRON 4 MG/1
4 TABLET, FILM COATED ORAL EVERY 8 HOURS PRN
Qty: 15 TABLET | Refills: 0 | Status: SHIPPED | OUTPATIENT
Start: 2019-10-02

## 2019-10-02 NOTE — TELEPHONE ENCOUNTER
Pt states she was prescribed progestrone on Monday by Riddhi Vickers. States she is still bleeding and has clotting. Requesting to speak to nurse.

## 2019-10-02 NOTE — TELEPHONE ENCOUNTER
Pt has been having vaginal bleeding since 08/18/19. See pt's Hx/progress notes regarding her heavy vaginal bleeding. Pt currently on Provera. Pt voices her vaginal bleeding is heavy and shows no signs of tapering off.  Today, pt is saturating a super s

## 2019-10-02 NOTE — TELEPHONE ENCOUNTER
Has been having vaginal bleeding off and on since August 25. Was treated with provera 10 mg in September which lightened the flow but when stopped the provera on September 25 bleeding came back very heavy.   Now the bleeding is getting heavier and there ar

## 2019-10-07 NOTE — TELEPHONE ENCOUNTER
Pt wanted to inform that bleeding stopped with ocp tx given by [facility] and would like to know if a bx would still take place at next visit. Pt would like to plan ahead since she may go back to work after the visit if possible. Pls advise.

## 2019-10-08 NOTE — TELEPHONE ENCOUNTER
I believe a biopsy is indicated. I will be gone until November 11. She can see Dr. Jose Hay or wait till I return.   She can also discuss this with Dr. Manuel Euceda as I believe he may be starting infertility treatment and this may include an endometrial biopsy

## 2019-10-08 NOTE — TELEPHONE ENCOUNTER
Pt is scheduled with [facility] for office visit tomorrow 10/9/19 per SJM will proceed with embx at that time. Pt has appt with Dr. Mari Dulac for consult this Saturday. Pt verbalizes understanding and agrees with plan. Provider notified.

## 2019-10-09 ENCOUNTER — OFFICE VISIT (OUTPATIENT)
Dept: OBGYN CLINIC | Facility: CLINIC | Age: 34
End: 2019-10-09
Payer: COMMERCIAL

## 2019-10-09 VITALS — DIASTOLIC BLOOD PRESSURE: 72 MMHG | SYSTOLIC BLOOD PRESSURE: 109 MMHG | WEIGHT: 221 LBS | BODY MASS INDEX: 45 KG/M2

## 2019-10-09 DIAGNOSIS — N92.0 EXCESSIVE OR FREQUENT MENSTRUATION: ICD-10-CM

## 2019-10-09 DIAGNOSIS — Z01.419 WOMEN'S ANNUAL ROUTINE GYNECOLOGICAL EXAMINATION: ICD-10-CM

## 2019-10-09 DIAGNOSIS — Z11.3 SCREENING FOR STDS (SEXUALLY TRANSMITTED DISEASES): ICD-10-CM

## 2019-10-09 DIAGNOSIS — Z32.02 NEGATIVE PREGNANCY TEST: ICD-10-CM

## 2019-10-09 DIAGNOSIS — N93.9 ABNORMAL UTERINE BLEEDING (AUB): Primary | ICD-10-CM

## 2019-10-09 PROCEDURE — 81025 URINE PREGNANCY TEST: CPT | Performed by: ADVANCED PRACTICE MIDWIFE

## 2019-10-09 PROCEDURE — 58100 BIOPSY OF UTERUS LINING: CPT | Performed by: ADVANCED PRACTICE MIDWIFE

## 2019-10-09 NOTE — PROCEDURES
Endometrial Biopsy    Bleeding finally stopped with use of short course of OCP. Has appt with US and Dr. Sarah Pruitt on Saturday    Pre-Procedure Care:   Consent was obtained. Procedure/risks were explained. Questions were answered.   Correct patient was identi See session report

## 2019-10-10 ENCOUNTER — TELEPHONE (OUTPATIENT)
Dept: OBGYN CLINIC | Facility: CLINIC | Age: 34
End: 2019-10-10

## 2019-10-10 NOTE — TELEPHONE ENCOUNTER
CERTIFICATE OF RETURN TO WORK    May 26, 2017      Re:   Keisha Page  [address] [facility] [id number]                        This is to certify that Keisha Page has been under my care from 5/26/2017 and can return to light work on 5/26/2017.    RESTRICTIONS: Ok to bear weight.  Should remain in knee brace at all times.  No bending of knee.  Unable to stand for >30 minutes at a time.  No squatting or kneeling.            SIGNATURE:___________________________________________,   5/26/2017      Leonides Goodman MD           Orthopaedics  [address]  [address] [address]         Per pt had an [facility] yesterday and states she's bleeding as if she has her period.  Please advise

## 2019-10-10 NOTE — TELEPHONE ENCOUNTER
This still sounds normal.  If the patient is on OCPs I would like to know where she is in her pill pack?

## 2019-10-10 NOTE — TELEPHONE ENCOUNTER
Pt of S[facility] who had EMBX yesterday experienced mild cramping and spotting immediately following EMBX as she was advised to expect. However today cramping is more significant - 6/10 is well controlled with Aleve.   Pt also has heavier bleeding than was expect

## 2019-10-11 ENCOUNTER — TELEPHONE (OUTPATIENT)
Dept: OBGYN CLINIC | Facility: CLINIC | Age: 34
End: 2019-10-11

## 2019-10-11 NOTE — TELEPHONE ENCOUNTER
Pt voices she stopped her [facility] after her EmBx on 10/09 because she is wanting to get pregnancy soon. Pt was only taking BC to stop her bleeding for the EmBx.  Pt voices after her phone call yesterday, her vaginal bleeding slowed down and she began to have spo

## 2019-10-11 NOTE — TELEPHONE ENCOUNTER
iNFORMED PT OF MESSAGE BELOW. PT VOICES UNDERSTANDING OF MLM'S MESSAGE    Sulaiman Fitzgerald MD   to [facility] Ob[facility]ne Clinical Staff         11:15 AM   Note      It sounds like she is having her period now that she stopped her OCPs.   Half a pad in four hour

## 2019-10-11 NOTE — TELEPHONE ENCOUNTER
Pt informed of results.  Pt voices understanding.    ----- Message from DIA Burton sent at 10/10/2019 10:19 PM CDT -----  Please let pt know the embx was benign

## 2019-10-11 NOTE — TELEPHONE ENCOUNTER
It sounds like she is having her period now that she stopped her OCPs. Half a pad in four hours sounds normal.  If she is soaking a pad in an hour then that is too heavy and should contact us or go to the ER.

## 2019-10-18 ENCOUNTER — LAB REQUISITION (OUTPATIENT)
Dept: LAB | Facility: HOSPITAL | Age: 34
End: 2019-10-18
Payer: COMMERCIAL

## 2019-10-18 DIAGNOSIS — N93.8 OTHER SPECIFIED ABNORMAL UTERINE AND VAGINAL BLEEDING: ICD-10-CM

## 2019-10-18 PROCEDURE — 88305 TISSUE EXAM BY PATHOLOGIST: CPT | Performed by: SPECIALIST

## 2020-01-22 ENCOUNTER — MED REC SCAN ONLY (OUTPATIENT)
Dept: OBGYN CLINIC | Facility: CLINIC | Age: 35
End: 2020-01-22

## 2020-11-13 ENCOUNTER — TELEMEDICINE (OUTPATIENT)
Dept: INTERNAL MEDICINE CLINIC | Facility: CLINIC | Age: 35
End: 2020-11-13
Payer: COMMERCIAL

## 2020-11-13 ENCOUNTER — NURSE TRIAGE (OUTPATIENT)
Dept: INTERNAL MEDICINE CLINIC | Facility: CLINIC | Age: 35
End: 2020-11-13

## 2020-11-13 DIAGNOSIS — Z20.822 EXPOSURE TO COVID-19 VIRUS: Primary | ICD-10-CM

## 2020-11-13 PROCEDURE — 99213 OFFICE O/P EST LOW 20 MIN: CPT | Performed by: INTERNAL MEDICINE

## 2020-11-13 NOTE — TELEPHONE ENCOUNTER
Action Requested: Summary for Provider     []  Critical Lab, Recommendations Needed  [] Need Additional Advice  [x]   FYI    []   Need Orders  [] Need Medications Sent to Pharmacy  []  Other     SUMMARY:   Spoke with pt,  verified, pt stated she had a d

## 2020-11-13 NOTE — PROGRESS NOTES
Adriana Gandhi is a 30year old female. No chief complaint on file. Virtual/Telephone Check-In    Alicia KAYY Sinclair verbally consents to a Virtual/Telephone Check-In service on 11/13/20.   Patient has been referred to the [facility] website at [address] • Genito-Urinary Disorder Maternal Aunt         Endometriosis   • Prostate Cancer Paternal Grandfather         Gentamycin [Gentami*    SWELLING     REVIEW OF SYSTEMS:     Review of Systems   Constitutional: Negative for activity change, appetite change

## 2020-11-16 ENCOUNTER — APPOINTMENT (OUTPATIENT)
Dept: LAB | Age: 35
End: 2020-11-16
Attending: INTERNAL MEDICINE
Payer: COMMERCIAL

## 2020-11-16 DIAGNOSIS — Z20.822 EXPOSURE TO COVID-19 VIRUS: ICD-10-CM

## 2021-01-08 ENCOUNTER — TELEMEDICINE (OUTPATIENT)
Dept: INTERNAL MEDICINE CLINIC | Facility: CLINIC | Age: 36
End: 2021-01-08

## 2021-01-08 DIAGNOSIS — J01.00 ACUTE MAXILLARY SINUSITIS, RECURRENCE NOT SPECIFIED: Primary | ICD-10-CM

## 2021-01-08 PROCEDURE — 99213 OFFICE O/P EST LOW 20 MIN: CPT | Performed by: INTERNAL MEDICINE

## 2021-01-08 RX ORDER — AMOXICILLIN AND CLAVULANATE POTASSIUM 875; 125 MG/1; MG/1
1 TABLET, FILM COATED ORAL 2 TIMES DAILY
Qty: 14 TABLET | Refills: 0 | Status: SHIPPED | OUTPATIENT
Start: 2021-01-08 | End: 2021-01-15

## 2021-01-08 NOTE — PROGRESS NOTES
Whitney Strnage is a 30year old female. No chief complaint on file. Virtual/Telephone Check-In    Whitney Strange verbally consents to a Virtual/Telephone Check-In service on 01/08/21.   Patient has been referred to the [facility] website at [employer] Never Used    Alcohol use: No      Alcohol/week: 0.0 standard drinks    Drug use: No     Family History   Problem Relation Age of Onset   • Diabetes Maternal Aunt    • Genito-Urinary Disorder Maternal Aunt         Endometriosis   • Prostate Cancer Paternal

## 2021-06-22 ENCOUNTER — TELEPHONE (OUTPATIENT)
Dept: OBGYN CLINIC | Facility: CLINIC | Age: 36
End: 2021-06-22

## 2021-07-02 ENCOUNTER — HOSPITAL ENCOUNTER (OUTPATIENT)
Age: 36
Discharge: ANOTHER HOSPITAL | End: 2021-07-02
Payer: COMMERCIAL

## 2021-07-02 ENCOUNTER — HOSPITAL ENCOUNTER (EMERGENCY)
Facility: HOSPITAL | Age: 36
Discharge: HOME | End: 2021-07-03
Attending: EMERGENCY MEDICINE
Payer: COMMERCIAL

## 2021-07-02 ENCOUNTER — APPOINTMENT (OUTPATIENT)
Dept: CT IMAGING | Facility: HOSPITAL | Age: 36
End: 2021-07-02
Attending: EMERGENCY MEDICINE
Payer: COMMERCIAL

## 2021-07-02 VITALS
HEART RATE: 76 BPM | OXYGEN SATURATION: 100 % | SYSTOLIC BLOOD PRESSURE: 116 MMHG | TEMPERATURE: 99 F | HEIGHT: 59 IN | BODY MASS INDEX: 42.33 KG/M2 | DIASTOLIC BLOOD PRESSURE: 65 MMHG | WEIGHT: 210 LBS | RESPIRATION RATE: 16 BRPM

## 2021-07-02 DIAGNOSIS — R42 VERTIGO: Primary | ICD-10-CM

## 2021-07-02 DIAGNOSIS — M25.561 ARTHRALGIA OF RIGHT LOWER LEG: ICD-10-CM

## 2021-07-02 DIAGNOSIS — J01.90 ACUTE SINUSITIS, RECURRENCE NOT SPECIFIED, UNSPECIFIED LOCATION: ICD-10-CM

## 2021-07-02 DIAGNOSIS — R55 SYNCOPE, NEAR: ICD-10-CM

## 2021-07-02 DIAGNOSIS — J01.10 ACUTE NON-RECURRENT FRONTAL SINUSITIS: ICD-10-CM

## 2021-07-02 DIAGNOSIS — M79.669 CALF PAIN, UNSPECIFIED LATERALITY: Primary | ICD-10-CM

## 2021-07-02 DIAGNOSIS — Z30.41 ORAL CONTRACEPTIVE USE: ICD-10-CM

## 2021-07-02 LAB
ALBUMIN SERPL-MCNC: 3.4 G/DL (ref 3.4–5)
ALP LIVER SERPL-CCNC: 59 U/L
ALT SERPL-CCNC: 28 U/L
ANION GAP SERPL CALC-SCNC: 9 MMOL/L (ref 0–18)
AST SERPL-CCNC: 15 U/L (ref 15–37)
BASOPHILS # BLD AUTO: 0.08 X10(3) UL (ref 0–0.2)
BASOPHILS NFR BLD AUTO: 0.6 %
BILIRUB DIRECT SERPL-MCNC: <0.1 MG/DL (ref 0–0.2)
BILIRUB SERPL-MCNC: 0.2 MG/DL (ref 0.1–2)
BUN BLD-MCNC: 15 MG/DL (ref 7–18)
BUN/CREAT SERPL: 19.5 (ref 10–20)
CALCIUM BLD-MCNC: 8.9 MG/DL (ref 8.5–10.1)
CHLORIDE SERPL-SCNC: 106 MMOL/L (ref 98–112)
CO2 SERPL-SCNC: 24 MMOL/L (ref 21–32)
CREAT BLD-MCNC: 0.77 MG/DL
D DIMER PPP FEU-MCNC: 0.96 UG/ML FEU (ref ?–0.5)
DEPRECATED RDW RBC AUTO: 43 FL (ref 35.1–46.3)
EOSINOPHIL # BLD AUTO: 0.21 X10(3) UL (ref 0–0.7)
EOSINOPHIL NFR BLD AUTO: 1.6 %
ERYTHROCYTE [DISTWIDTH] IN BLOOD BY AUTOMATED COUNT: 14.8 % (ref 11–15)
GLUCOSE BLD-MCNC: 90 MG/DL (ref 70–99)
HCT VFR BLD AUTO: 37.8 %
HGB BLD-MCNC: 12.3 G/DL
IMM GRANULOCYTES # BLD AUTO: 0.05 X10(3) UL (ref 0–1)
IMM GRANULOCYTES NFR BLD: 0.4 %
LYMPHOCYTES # BLD AUTO: 4.41 X10(3) UL (ref 1–4)
LYMPHOCYTES NFR BLD AUTO: 33.2 %
M PROTEIN MFR SERPL ELPH: 7.9 G/DL (ref 6.4–8.2)
MCH RBC QN AUTO: 25.9 PG (ref 26–34)
MCHC RBC AUTO-ENTMCNC: 32.5 G/DL (ref 31–37)
MCV RBC AUTO: 79.6 FL
MONOCYTES # BLD AUTO: 0.85 X10(3) UL (ref 0.1–1)
MONOCYTES NFR BLD AUTO: 6.4 %
NEUTROPHILS # BLD AUTO: 7.68 X10 (3) UL (ref 1.5–7.7)
NEUTROPHILS # BLD AUTO: 7.68 X10(3) UL (ref 1.5–7.7)
NEUTROPHILS NFR BLD AUTO: 57.8 %
OSMOLALITY SERPL CALC.SUM OF ELEC: 288 MOSM/KG (ref 275–295)
PLATELET # BLD AUTO: 435 10(3)UL (ref 150–450)
POTASSIUM SERPL-SCNC: 4.5 MMOL/L (ref 3.5–5.1)
RBC # BLD AUTO: 4.75 X10(6)UL
SODIUM SERPL-SCNC: 139 MMOL/L (ref 136–145)
WBC # BLD AUTO: 13.3 X10(3) UL (ref 4–11)

## 2021-07-02 PROCEDURE — 96361 HYDRATE IV INFUSION ADD-ON: CPT

## 2021-07-02 PROCEDURE — 99285 EMERGENCY DEPT VISIT HI MDM: CPT

## 2021-07-02 PROCEDURE — 71260 CT THORAX DX C+: CPT | Performed by: EMERGENCY MEDICINE

## 2021-07-02 PROCEDURE — 85379 FIBRIN DEGRADATION QUANT: CPT | Performed by: EMERGENCY MEDICINE

## 2021-07-02 PROCEDURE — 85025 COMPLETE CBC W/AUTO DIFF WBC: CPT

## 2021-07-02 PROCEDURE — 80048 BASIC METABOLIC PNL TOTAL CA: CPT | Performed by: EMERGENCY MEDICINE

## 2021-07-02 PROCEDURE — 80076 HEPATIC FUNCTION PANEL: CPT | Performed by: EMERGENCY MEDICINE

## 2021-07-02 PROCEDURE — 80048 BASIC METABOLIC PNL TOTAL CA: CPT

## 2021-07-02 PROCEDURE — 85025 COMPLETE CBC W/AUTO DIFF WBC: CPT | Performed by: EMERGENCY MEDICINE

## 2021-07-02 PROCEDURE — 99215 OFFICE O/P EST HI 40 MIN: CPT | Performed by: EMERGENCY MEDICINE

## 2021-07-02 PROCEDURE — 96360 HYDRATION IV INFUSION INIT: CPT

## 2021-07-02 RX ORDER — METFORMIN HYDROCHLORIDE 750 MG/1
750 TABLET, EXTENDED RELEASE ORAL DAILY
COMMUNITY
Start: 2021-06-18

## 2021-07-02 RX ORDER — AMOXICILLIN AND CLAVULANATE POTASSIUM 875; 125 MG/1; MG/1
1 TABLET, FILM COATED ORAL 2 TIMES DAILY
Qty: 12 TABLET | Refills: 0 | Status: SHIPPED | OUTPATIENT
Start: 2021-07-02 | End: 2021-07-08

## 2021-07-02 NOTE — ED PROVIDER NOTES
Patient Seen in: [facility]      History   Patient presents with:  Sinus Problem: Ear ache, sinus head ache, congestion - Entered by patient  Headache  Ear Problem    Stated Complaint: Sinus Problem - Ear ache, sinus head ache, congestion and vital signs reviewed. All other systems reviewed and negative except as noted above.     Physical Exam     ED Triage Vitals [07/02/21 1816]   /65   Pulse 76   Resp 16   Temp 98.7 °F (37.1 °C)   Temp src Temporal   SpO2 100 %   O2 Device None cardiopulmonary, metabolic, or other causes of symptoms. Discussed with patient that we do not sensitive imagine and blood work she needs at this time.   I discussed case with Dr. Blanka Kinney, and patient is to go to the emergency room for further evaluatio

## 2021-07-02 NOTE — ED INITIAL ASSESSMENT (HPI)
C/o temporal headache started yesterday Had episode of dizziness  Rt ear and sinus pain today  ears feels muffled

## 2021-07-03 VITALS
BODY MASS INDEX: 42.33 KG/M2 | RESPIRATION RATE: 20 BRPM | WEIGHT: 210 LBS | HEART RATE: 75 BPM | OXYGEN SATURATION: 99 % | TEMPERATURE: 98 F | HEIGHT: 59 IN | DIASTOLIC BLOOD PRESSURE: 78 MMHG | SYSTOLIC BLOOD PRESSURE: 117 MMHG

## 2021-07-03 NOTE — ED QUICK NOTES
Patient provided with discharge instructions. Verbalized understanding for plan of care at home and follow up. All question and concerns addressed prior to discharge. IV REMOVED CATHETER INTACT. LET HER KNOW RX WAS SENT TO PHARMACY.

## 2021-07-03 NOTE — ED INITIAL ASSESSMENT (HPI)
Right calf pain x 2 days. Dizziness and migraine starting yesterday, a/w \"muffled hearing\" to the right ear and feeling \"off balance\". Pt concerned that she is on birth control and may have a blood clot.  Sent from immediate care for further eval.

## 2021-07-03 NOTE — ED PROVIDER NOTES
Patient Seen in: [facility] AND [facility] Emergency Department    History   Patient presents with:  Dizziness  Headache      HPI    36-year-old female presents the ER for rule out will extremity DVT.   Patient states she is on birth control was told by her nurse p History and Family History elements reviewed from today, pertinent positives to the presenting problem noted.     Physical Exam     ED Triage Vitals [07/02/21 1908]   /81   Pulse 84   Resp 16   Temp 98.4 °F (36.9 °C)   Temp src Oral   SpO2 97 %   O2 D Reviewed   D-DIMER - Abnormal; Notable for the following components:       Result Value    D-Dimer 0.96 (*)     All other components within normal limits   CBC W/ DIFFERENTIAL - Abnormal; Notable for the following components:    WBC 13.3 (*)     MCV 79.6 ( reports. Complicating Factors: The patient already has does not have any pertinent problems on file. to contribute to the complexity of this ED evaluation. ED Course: 81-year-old female presents the ER with complaint of lightheadedness and calf pain.

## 2021-09-05 ENCOUNTER — HOSPITAL ENCOUNTER (EMERGENCY)
Facility: HOSPITAL | Age: 36
Discharge: HOME | End: 2021-09-06
Attending: EMERGENCY MEDICINE
Payer: COMMERCIAL

## 2021-09-05 DIAGNOSIS — O03.9 MISCARRIAGE: Primary | ICD-10-CM

## 2021-09-05 LAB
ANION GAP SERPL CALC-SCNC: 5 MMOL/L (ref 0–18)
B-HCG UR QL: POSITIVE
BASOPHILS # BLD AUTO: 0.09 X10(3) UL (ref 0–0.2)
BASOPHILS NFR BLD AUTO: 0.6 %
BUN BLD-MCNC: 11 MG/DL (ref 7–18)
BUN/CREAT SERPL: 16.9 (ref 10–20)
CALCIUM BLD-MCNC: 8.4 MG/DL (ref 8.5–10.1)
CHLORIDE SERPL-SCNC: 108 MMOL/L (ref 98–112)
CO2 SERPL-SCNC: 26 MMOL/L (ref 21–32)
CREAT BLD-MCNC: 0.65 MG/DL
DEPRECATED RDW RBC AUTO: 45.8 FL (ref 35.1–46.3)
EOSINOPHIL # BLD AUTO: 0.28 X10(3) UL (ref 0–0.7)
EOSINOPHIL NFR BLD AUTO: 1.8 %
ERYTHROCYTE [DISTWIDTH] IN BLOOD BY AUTOMATED COUNT: 15.5 % (ref 11–15)
GLUCOSE BLD-MCNC: 96 MG/DL (ref 70–99)
HCT VFR BLD AUTO: 35.2 %
HGB BLD-MCNC: 10.9 G/DL
IMM GRANULOCYTES # BLD AUTO: 0.06 X10(3) UL (ref 0–1)
IMM GRANULOCYTES NFR BLD: 0.4 %
LYMPHOCYTES # BLD AUTO: 4.32 X10(3) UL (ref 1–4)
LYMPHOCYTES NFR BLD AUTO: 28.3 %
MCH RBC QN AUTO: 25.2 PG (ref 26–34)
MCHC RBC AUTO-ENTMCNC: 31 G/DL (ref 31–37)
MCV RBC AUTO: 81.3 FL
MONOCYTES # BLD AUTO: 1.05 X10(3) UL (ref 0.1–1)
MONOCYTES NFR BLD AUTO: 6.9 %
NEUTROPHILS # BLD AUTO: 9.48 X10 (3) UL (ref 1.5–7.7)
NEUTROPHILS # BLD AUTO: 9.48 X10(3) UL (ref 1.5–7.7)
NEUTROPHILS NFR BLD AUTO: 62 %
OSMOLALITY SERPL CALC.SUM OF ELEC: 287 MOSM/KG (ref 275–295)
PLATELET # BLD AUTO: 446 10(3)UL (ref 150–450)
POTASSIUM SERPL-SCNC: 4 MMOL/L (ref 3.5–5.1)
RBC # BLD AUTO: 4.33 X10(6)UL
RH BLOOD TYPE: POSITIVE
SODIUM SERPL-SCNC: 139 MMOL/L (ref 136–145)
WBC # BLD AUTO: 15.3 X10(3) UL (ref 4–11)

## 2021-09-05 PROCEDURE — 84702 CHORIONIC GONADOTROPIN TEST: CPT | Performed by: EMERGENCY MEDICINE

## 2021-09-05 PROCEDURE — 99284 EMERGENCY DEPT VISIT MOD MDM: CPT

## 2021-09-05 PROCEDURE — 86901 BLOOD TYPING SEROLOGIC RH(D): CPT | Performed by: EMERGENCY MEDICINE

## 2021-09-05 PROCEDURE — 85025 COMPLETE CBC W/AUTO DIFF WBC: CPT | Performed by: EMERGENCY MEDICINE

## 2021-09-05 PROCEDURE — 80048 BASIC METABOLIC PNL TOTAL CA: CPT | Performed by: EMERGENCY MEDICINE

## 2021-09-05 PROCEDURE — 36415 COLL VENOUS BLD VENIPUNCTURE: CPT

## 2021-09-05 PROCEDURE — 86900 BLOOD TYPING SEROLOGIC ABO: CPT | Performed by: EMERGENCY MEDICINE

## 2021-09-05 PROCEDURE — 81025 URINE PREGNANCY TEST: CPT

## 2021-09-06 ENCOUNTER — APPOINTMENT (OUTPATIENT)
Dept: ULTRASOUND IMAGING | Facility: HOSPITAL | Age: 36
End: 2021-09-06
Attending: EMERGENCY MEDICINE
Payer: COMMERCIAL

## 2021-09-06 VITALS
DIASTOLIC BLOOD PRESSURE: 66 MMHG | SYSTOLIC BLOOD PRESSURE: 119 MMHG | BODY MASS INDEX: 42.33 KG/M2 | OXYGEN SATURATION: 98 % | HEART RATE: 94 BPM | RESPIRATION RATE: 20 BRPM | WEIGHT: 210 LBS | TEMPERATURE: 98 F | HEIGHT: 59 IN

## 2021-09-06 LAB — B-HCG SERPL-ACNC: 1303 MIU/ML

## 2021-09-06 PROCEDURE — 76801 OB US < 14 WKS SINGLE FETUS: CPT | Performed by: EMERGENCY MEDICINE

## 2021-09-06 PROCEDURE — 76817 TRANSVAGINAL US OBSTETRIC: CPT | Performed by: EMERGENCY MEDICINE

## 2021-09-06 NOTE — ED INITIAL ASSESSMENT (HPI)
Pt is 5 weeks pregnant and states she is having a miscarriage. Pt went to the restroom and was bleeding and \"saw it come out. \"

## 2021-09-06 NOTE — ED PROVIDER NOTES
Patient Seen in: [facility] AND [facility] Emergency Department    History   Patient presents with:  Pregnancy Issues      HPI    76-year-old female G2, P0 presents ER for vaginal bleeding. Patient states she is approximately 5 weeks pregnant.   Patient states the prevent infection transmission during my interaction with the patient were taken. The patient was already wearing a droplet mask on my arrival to the room.  Personal protective equipment including droplet mask, eye protection, and gloves were worn throughou normal limits   POCT PREGNANCY URINE - Abnormal; Notable for the following components:    POCT Urine Pregnancy Positive (*)     All other components within normal limits   CBC W/ DIFFERENTIAL - Abnormal; Notable for the following components:    WBC 15.3 Fermin Rascon problems on file. to contribute to the complexity of this ED evaluation. ED Course: 62-year-old female presents the ER with complaint of vaginal bleeding. Patient ultrasound signed out to [facility] for final disposition.     Condition upon leaving t

## 2021-09-15 NOTE — TELEPHONE ENCOUNTER
Pt Name and  verified. Patient informed and verbalized understanding. Referral placed for Dr Harp called daughter and advised. She offered no questions and is in agreement to call or seek care should symptoms change or worsen.

## 2021-09-20 ENCOUNTER — TELEPHONE (OUTPATIENT)
Dept: OBGYN UNIT | Facility: HOSPITAL | Age: 36
End: 2021-09-20

## 2021-12-26 ENCOUNTER — HOSPITAL ENCOUNTER (OUTPATIENT)
Age: 36
Discharge: HOME | End: 2021-12-26
Payer: COMMERCIAL

## 2021-12-26 VITALS
OXYGEN SATURATION: 99 % | DIASTOLIC BLOOD PRESSURE: 79 MMHG | HEART RATE: 88 BPM | SYSTOLIC BLOOD PRESSURE: 125 MMHG | RESPIRATION RATE: 18 BRPM | TEMPERATURE: 97 F

## 2021-12-26 DIAGNOSIS — J02.0 STREPTOCOCCAL SORE THROAT: Primary | ICD-10-CM

## 2021-12-26 LAB
S PYO AG THROAT QL: POSITIVE
SARS-COV-2 RNA RESP QL NAA+PROBE: NOT DETECTED

## 2021-12-26 PROCEDURE — 87880 STREP A ASSAY W/OPTIC: CPT

## 2021-12-26 PROCEDURE — 99213 OFFICE O/P EST LOW 20 MIN: CPT

## 2021-12-26 RX ORDER — AMOXICILLIN 875 MG/1
875 TABLET, COATED ORAL 2 TIMES DAILY
Qty: 20 TABLET | Refills: 0 | Status: SHIPPED | OUTPATIENT
Start: 2021-12-26 | End: 2022-01-05

## 2021-12-26 NOTE — ED PROVIDER NOTES
Patient Seen in: [facility]      History   Patient presents with:  Cough/URI    Stated Complaint: sylvie throat, nasal congestion x 3 days    Subjective:   32yo female presents to IC today with c/o cough and ST for the lat few days.  10 days s/p ill-appearing or toxic-appearing. HENT:      Head: Normocephalic and atraumatic. Nose: Nose normal. No congestion or rhinorrhea.       Mouth/Throat:      Mouth: Mucous membranes are moist.      Pharynx: No oropharyngeal exudate or posterior oropharyn

## 2022-08-08 ENCOUNTER — HOSPITAL ENCOUNTER (OUTPATIENT)
Age: 37
Discharge: HOME | End: 2022-08-08
Payer: COMMERCIAL

## 2022-08-08 VITALS
WEIGHT: 200 LBS | SYSTOLIC BLOOD PRESSURE: 104 MMHG | TEMPERATURE: 98 F | HEIGHT: 59 IN | OXYGEN SATURATION: 100 % | BODY MASS INDEX: 40.32 KG/M2 | DIASTOLIC BLOOD PRESSURE: 73 MMHG | RESPIRATION RATE: 20 BRPM | HEART RATE: 72 BPM

## 2022-08-08 DIAGNOSIS — S01.551A DOG BITE OF SKIN OF LIP, INITIAL ENCOUNTER: Primary | ICD-10-CM
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
## 2022-08-08 PROCEDURE — 99213 OFFICE O/P EST LOW 20 MIN: CPT | Performed by: NURSE PRACTITIONER

## 2022-08-08 PROCEDURE — 90471 IMMUNIZATION ADMIN: CPT | Performed by: NURSE PRACTITIONER

## 2022-08-08 PROCEDURE — 90715 TDAP VACCINE 7 YRS/> IM: CPT | Performed by: NURSE PRACTITIONER

## 2022-08-08 RX ORDER — AMOXICILLIN AND CLAVULANATE POTASSIUM 875; 125 MG/1; MG/1
1 TABLET, FILM COATED ORAL 2 TIMES DAILY
Qty: 20 TABLET | Refills: 0 | Status: SHIPPED | OUTPATIENT
Start: 2022-08-08 | End: 2022-08-18

## (undated) NOTE — LETTER
10/30/2019              Alicia Sinclair        [address] [place] [id number]         Dear Kayli Files,    Our records indicate that the blood test and pelvic ultrasound test ordered for you by DIA Moreno have not been done.   If you

## (undated) NOTE — ED AVS SNAPSHOT
Bren Hernandez   MRN: [id number]    Department:  [facility] Emergency Department   Date of Visit:  9/7/2019           Disclosure     Insurance plans vary and the physician(s) referred by the ER may not be covered by your plan.  Please con CARE PHYSICIAN AT ONCE OR RETURN IMMEDIATELY TO THE EMERGENCY DEPARTMENT. If you have been prescribed any medication(s), please fill your prescription right away and begin taking the medication(s) as directed.   If you believe that any of the medications

## (undated) NOTE — LETTER
11/13/2020          To Whom It May Concern:    Rosa North is currently under my medical care. Because of the nature of her disease, I have encouraged her to quarantine for next 10 days.   If she is asymptomatic, she can return back to work with

## (undated) NOTE — MR AVS SNAPSHOT
[address] [facility] [address]  [phone]               Thank you for choosing us for your health care visit with Nohemy Bell MD.  We are glad to serve you and happy to provide you with this summary of your visit. Take 1 tablet (500 mg total) by mouth 2 (two) times daily.    Commonly known as:  GLUCOPHAGE                Where to Get Your Medications      These medications were sent to [facility] [address] [facility], [facility]

## (undated) NOTE — MR AVS SNAPSHOT
After Visit Summary   10/9/2019    Tawanna Daily    MRN: [id number]           Visit Information     Date & Time  10/9/2019  3:00 PM Provider  Madd[address] Lemus [address] [facility]  Phone  [phone] SURGICAL PATHOLOGY TISSUE [[id number] CUSTOM]  10/9/2019 10/9/2020    THINPREP PAP SMEAR B [[id number] CUSTOM]  10/9/2019 10/9/2020    THINPREP PAP SMEAR ONLY [[id number] CUSTOM]  10/9/2019 10/9/2020      Future Appointments        Provider Department    10/12/201 4. Enter your Zip Code and Date of Birth (mm/dd/yyyy) as indicated and click Next. You will be taken to the next sign-up page. 5. Create a MyChart Username.  This will be your [employer] login Username and cannot be changed, so think of one that is secure and Average cost  $70*       VIDEO VISITS  Visit face-to-face with a [facility] physician or [facility]  using your mobile device or computer   using [employer]      e-VISITS  Communicate with a [facility] physician or [facility]  online.   The physician will respond and provide a treatment

## (undated) NOTE — LETTER
[address]  [facility] [address]  Dept: [phone]  Dept Fax: [phone]  Loc: [phone]      January 12, 2017    Patient: Joselito West   Date of Visit: 1/12/2017       To Whom It May Concern:

## (undated) NOTE — MR AVS SNAPSHOT
[facility]  [address] [facility] [address] [phone]               Thank you for choosing us for your health care visit with Bhavesh Harper MD.  We are glad to serve you and happy to provide you with this summary of your area. Try both hot and cold to find out which works best for you. If you use hot packs (small towels soaked in hot water), be careful not to burn yourself.   · You may take acetaminophen or ibuprofen for pain, unless you were given a different pain medicine Follow up with your healthcare provider, or as advised. Further testing and additional treatment may be required. If changes to your lifestyle do not improve your symptoms, talk with your healthcare provider about other available therapies.  These include b Make it a habit to assess your body a few times each day. Try writing yourself a reminder. Or set an alarm on your watch or computer. When doing a self-check, ask yourself:  · Do I feel stressed? · Are my muscles tense?   · Am I grinding or clenching my te injury. · Try walking or swimming. These activities are easy on your joints. They also benefit your heart and lungs. · Try yoga or satya chi. These are relaxing activities known for reducing stress.   Date Last Reviewed: 7/13/2015  © 1930-4391 [employer] · Antidepressants. These can be used to reduce pain or bruxism (teeth grinding). At higher dosages, these medicines are used to treat depression. Given at low dosages, antidepressants help relieve TMD symptoms. They can reduce muscle pain.  They also raise · Massage and gentle manipulation as described above. Date Last Reviewed: 7/13/2015  © 5371-7664 [address], [address] [place] [address]. All rights reserved.  This information is not intended as a substitute for professional m If you have questions, you can call [phone] to talk to our [facility] Staff. Remember, [employer] is NOT to be used for urgent needs. For medical emergencies, dial 911.         Educational Information     Healthy Diet and Regular Exercise  The [employer]

## (undated) NOTE — LETTER
AUTHORIZATION FOR SURGICAL OPERATION OR OTHER PROCEDURE    1.  I hereby authorize Justyna SHERIDAN, and [facility] staff assigned to my case to perform the following operation and/or procedure at the [facility]: Critical access hospital & REHAB CENTER  _______________________ Patient signature:  ___________________________________________________             Relationship to Patient:           []  Parent    Responsible person                          []  Spouse  In case of minor or                    [] Other  _____________   In

## (undated) NOTE — LETTER
10/18/2019              Alicia Sinclair        [address] [place] [address]         Dear Xavier Robertson,    It was a pleasure to see you. Your PAP test was normal.  There is no need for further testing at this time.   I look forward to seeing